# Patient Record
Sex: MALE | Race: WHITE | NOT HISPANIC OR LATINO | Employment: FULL TIME | URBAN - METROPOLITAN AREA
[De-identification: names, ages, dates, MRNs, and addresses within clinical notes are randomized per-mention and may not be internally consistent; named-entity substitution may affect disease eponyms.]

---

## 2020-12-05 ENCOUNTER — OFFICE VISIT (OUTPATIENT)
Dept: URGENT CARE | Facility: CLINIC | Age: 35
End: 2020-12-05
Payer: COMMERCIAL

## 2020-12-05 VITALS
HEIGHT: 68 IN | RESPIRATION RATE: 16 BRPM | HEART RATE: 77 BPM | OXYGEN SATURATION: 99 % | BODY MASS INDEX: 30.31 KG/M2 | DIASTOLIC BLOOD PRESSURE: 90 MMHG | TEMPERATURE: 99 F | WEIGHT: 200 LBS | SYSTOLIC BLOOD PRESSURE: 152 MMHG

## 2020-12-05 DIAGNOSIS — J00 ACUTE RHINITIS: Primary | ICD-10-CM

## 2020-12-05 LAB
CTP QC/QA: YES
SARS-COV-2 RDRP RESP QL NAA+PROBE: NEGATIVE

## 2020-12-05 PROCEDURE — 99203 OFFICE O/P NEW LOW 30 MIN: CPT | Mod: S$GLB,,, | Performed by: NURSE PRACTITIONER

## 2020-12-05 PROCEDURE — U0002: ICD-10-PCS | Mod: QW,S$GLB,, | Performed by: NURSE PRACTITIONER

## 2020-12-05 PROCEDURE — 99203 PR OFFICE/OUTPT VISIT, NEW, LEVL III, 30-44 MIN: ICD-10-PCS | Mod: S$GLB,,, | Performed by: NURSE PRACTITIONER

## 2020-12-05 PROCEDURE — U0002 COVID-19 LAB TEST NON-CDC: HCPCS | Mod: QW,S$GLB,, | Performed by: NURSE PRACTITIONER

## 2020-12-05 RX ORDER — FLUTICASONE PROPIONATE 50 MCG
1 SPRAY, SUSPENSION (ML) NASAL DAILY
Qty: 9.9 ML | Refills: 0 | Status: SHIPPED | OUTPATIENT
Start: 2020-12-05

## 2020-12-05 RX ORDER — CETIRIZINE HYDROCHLORIDE 10 MG/1
10 TABLET ORAL DAILY
Qty: 10 TABLET | Refills: 0 | Status: SHIPPED | OUTPATIENT
Start: 2020-12-05 | End: 2020-12-15

## 2020-12-05 NOTE — PATIENT INSTRUCTIONS
1.  Take all medications as directed. If you have been prescribed antibiotics, make sure to complete them.   2.  Rest and keep yourself/patient well hydrated. For adults, it is recommended to drink at least 8-10 glasses of water daily.   3.  For patients above 6 months of age who are not allergic to and are not on anticoagulants, you can alternate Tylenol and Motrin every 4-6 hours for fever above 100.4F and/or pain.  For patients less than 6 months of age, allergic to or intolerant to NSAIDS, have gastritis, gastric ulcers, or history of GI bleeds, are pregnant, or are on anticoagulant therapy, you can take Tylenol every 4 hours as needed for fever above 100.4F and/or pain.   4. You should schedule a follow-up appointment with your Primary Care Provider/Pediatrician for recheck in 2-3 days or as directed at this visit.   5.  If your condition fails to improve in a timely manner, you should receive another evaluation by your Primary Care Provider/Pediatrician to discuss your concerns or return to urgent care for a recheck.  If your condition worsens at any time, you should report immediately to your nearest Emergency Department for further evaluation. **You must understand that you have received Urgent Care treatment only and that you may be released before all of your medical problems are known or treated. You, the patient, are responsible to arrange for follow-up care as instructed.         Allergic Rhinitis  Allergic rhinitis is an allergic reaction that affects the nose, and often the eyes. Its often known as nasal allergies. Nasal allergies are often due to things in the environment that are breathed in. Depending what you are sensitive to, nasal allergies may occur only during certain seasons. Or they may occur year round. Common indoor allergens include house dust mites, mold, cockroaches, and pet dander. Outdoor allergens include pollen from trees, grasses, and weeds.   Symptoms include a drippy, stuffy,  and itchy nose. They also include sneezing and red and itchy eyes. You may feel tired more often. Severe allergies may also affect your breathing and trigger a condition called asthma.   Tests can be done to see what allergens are affecting you. You may be referred to an allergy specialist for testing and further evaluation.  Home care  Your healthcare provider may prescribe medicines to help relieve allergy symptoms. These may include oral medicines, nasal sprays, or eye drops.  Ask your provider for advice on how to avoid substances that you are allergic to. Below are a few tips for each type of allergen.  Pet dander:  · Do not have pets with fur and feathers.  · If you can't avoid having a pet, keep it out of your bedroom and off upholstered furniture.  Pollen:  · When pollen counts are high, keep windows of your car and home closed. If possible, use an air conditioner instead.  · Wear a filter mask when mowing or doing yard work.  House dust mites:  · Wash bedding every week in warm water and detergent and dry on a hot setting.  · Cover the mattress, box spring, and pillows with allergy covers.   · If possible, sleep in a room with no carpet, curtains, or upholstered furniture.  Cockroaches:  · Store food in sealed containers.  · Remove garbage from the home promptly.  · Fix water leaks  Mold:  · Keep humidity low by using a dehumidifier or air conditioner. Keep the dehumidifier and air conditioner clean and free of mold.  · Clean moldy areas with bleach and water.  In general:  · Vacuum once or twice a week. If possible, use a vacuum with a high-efficiency particulate air (HEPA) filter.  · Do not smoke. Avoid cigarette smoke. Cigarette smoke is an irritant that can make symptoms worse.  Follow-up care  Follow up as advised by the healthcare provider or our staff. If you were referred to an allergy specialist, make this appointment promptly.  When to seek medical advice  Call your healthcare provider right away  if the following occur:  · Coughing or wheezing  · Fever greater than 100.4°F (38°C)  · Hives (raised red bumps)  · Continuing symptoms, new symptoms, or worsening symptoms  Call 911 right away if you have:  · Trouble breathing  · Severe swelling of the face or severe itching of the eyes or mouth  Date Last Reviewed: 3/1/2017  © 7690-5389 [employer]. [address] [place] [address], [place] [address]. All rights reserved. This information is not intended as a substitute for professional medical care. Always follow your healthcare professional's instructions.

## 2020-12-05 NOTE — PROGRESS NOTES
"Subjective:       Patient ID: Clive Person is a 35 y.o. male.    Vitals:  height is 5' 8" (1.727 m) and weight is 90.7 kg (200 lb). His temperature is 98.5 °F (36.9 °C). His blood pressure is 152/90 (abnormal) and his pulse is 77. His respiration is 16 and oxygen saturation is 99%.     Chief Complaint: Nasal Congestion    Sinus Problem  This is a new problem. The current episode started in the past 7 days (x3days). The problem has been gradually worsening since onset. There has been no fever. Associated symptoms include congestion and sinus pressure. Pertinent negatives include no chills, coughing, diaphoresis, ear pain, headaches, shortness of breath, sneezing or sore throat. Past treatments include oral decongestants. The treatment provided mild relief.       Constitution: Negative for chills, sweating, fatigue and fever.   HENT: Positive for congestion and sinus pressure. Negative for ear pain, sinus pain, sore throat and voice change.    Neck: Negative for painful lymph nodes.   Eyes: Negative for eye redness.   Respiratory: Negative for chest tightness, cough, sputum production, bloody sputum, COPD, shortness of breath, stridor, wheezing and asthma.    Gastrointestinal: Negative for nausea and vomiting.   Musculoskeletal: Negative for muscle ache.   Skin: Negative for rash.   Allergic/Immunologic: Negative for seasonal allergies, asthma and sneezing.   Neurological: Negative for headaches.   Hematologic/Lymphatic: Negative for swollen lymph nodes.       Objective:      Physical Exam   Constitutional: He is oriented to person, place, and time. He appears well-developed. He is cooperative.  Non-toxic appearance. He does not appear ill. No distress.   HENT:   Head: Normocephalic and atraumatic.   Ears:   Right Ear: Hearing, tympanic membrane, external ear and ear canal normal.   Left Ear: Hearing, tympanic membrane, external ear and ear canal normal.   Nose: Congestion present. No mucosal edema, rhinorrhea or " nasal deformity. No epistaxis. Right sinus exhibits no maxillary sinus tenderness and no frontal sinus tenderness. Left sinus exhibits no maxillary sinus tenderness and no frontal sinus tenderness.   Mouth/Throat: Uvula is midline, oropharynx is clear and moist and mucous membranes are normal. No trismus in the jaw. Normal dentition. No uvula swelling. No oropharyngeal exudate, posterior oropharyngeal edema or posterior oropharyngeal erythema.   Eyes: Conjunctivae and lids are normal. No scleral icterus.   Neck: Trachea normal, full passive range of motion without pain and phonation normal. Neck supple. No neck rigidity. No edema and no erythema present.   Cardiovascular: Normal rate, regular rhythm, normal heart sounds and normal pulses.   No murmur heard.  Pulmonary/Chest: Effort normal and breath sounds normal. No stridor. No respiratory distress. He has no decreased breath sounds. He has no wheezes. He has no rhonchi. He has no rales. He exhibits no tenderness.   Abdominal: Normal appearance.   Musculoskeletal: Normal range of motion.         General: No deformity.   Lymphadenopathy:     He has no cervical adenopathy.   Neurological: He is alert and oriented to person, place, and time. He exhibits normal muscle tone. Coordination normal.   Skin: Skin is warm, dry, intact, not diaphoretic and not pale. Psychiatric: His speech is normal and behavior is normal. Judgment and thought content normal.   Nursing note and vitals reviewed.        Assessment:       1. Acute rhinitis        Plan:         Acute rhinitis  -     POCT COVID-19 Rapid Screening  -     cetirizine (ZYRTEC) 10 MG tablet; Take 1 tablet (10 mg total) by mouth once daily. for 10 days  Dispense: 10 tablet; Refill: 0  -     fluticasone propionate (FLONASE) 50 mcg/actuation nasal spray; 1 spray (50 mcg total) by Each Nostril route once daily.  Dispense: 9.9 mL; Refill: 0      Results for orders placed or performed in visit on 12/05/20   POCT COVID-19  Rapid Screening   Result Value Ref Range    POC Rapid COVID Negative Negative     Acceptable Yes      Patient Instructions   1.  Take all medications as directed. If you have been prescribed antibiotics, make sure to complete them.   2.  Rest and keep yourself/patient well hydrated. For adults, it is recommended to drink at least 8-10 glasses of water daily.   3.  For patients above 6 months of age who are not allergic to and are not on anticoagulants, you can alternate Tylenol and Motrin every 4-6 hours for fever above 100.4F and/or pain.  For patients less than 6 months of age, allergic to or intolerant to NSAIDS, have gastritis, gastric ulcers, or history of GI bleeds, are pregnant, or are on anticoagulant therapy, you can take Tylenol every 4 hours as needed for fever above 100.4F and/or pain.   4. You should schedule a follow-up appointment with your Primary Care Provider/Pediatrician for recheck in 2-3 days or as directed at this visit.   5.  If your condition fails to improve in a timely manner, you should receive another evaluation by your Primary Care Provider/Pediatrician to discuss your concerns or return to urgent care for a recheck.  If your condition worsens at any time, you should report immediately to your nearest Emergency Department for further evaluation. **You must understand that you have received Urgent Care treatment only and that you may be released before all of your medical problems are known or treated. You, the patient, are responsible to arrange for follow-up care as instructed.         Allergic Rhinitis  Allergic rhinitis is an allergic reaction that affects the nose, and often the eyes. Its often known as nasal allergies. Nasal allergies are often due to things in the environment that are breathed in. Depending what you are sensitive to, nasal allergies may occur only during certain seasons. Or they may occur year round. Common indoor allergens include house dust mites,  mold, cockroaches, and pet dander. Outdoor allergens include pollen from trees, grasses, and weeds.   Symptoms include a drippy, stuffy, and itchy nose. They also include sneezing and red and itchy eyes. You may feel tired more often. Severe allergies may also affect your breathing and trigger a condition called asthma.   Tests can be done to see what allergens are affecting you. You may be referred to an allergy specialist for testing and further evaluation.  Home care  Your healthcare provider may prescribe medicines to help relieve allergy symptoms. These may include oral medicines, nasal sprays, or eye drops.  Ask your provider for advice on how to avoid substances that you are allergic to. Below are a few tips for each type of allergen.  Pet dander:  · Do not have pets with fur and feathers.  · If you can't avoid having a pet, keep it out of your bedroom and off upholstered furniture.  Pollen:  · When pollen counts are high, keep windows of your car and home closed. If possible, use an air conditioner instead.  · Wear a filter mask when mowing or doing yard work.  House dust mites:  · Wash bedding every week in warm water and detergent and dry on a hot setting.  · Cover the mattress, box spring, and pillows with allergy covers.   · If possible, sleep in a room with no carpet, curtains, or upholstered furniture.  Cockroaches:  · Store food in sealed containers.  · Remove garbage from the home promptly.  · Fix water leaks  Mold:  · Keep humidity low by using a dehumidifier or air conditioner. Keep the dehumidifier and air conditioner clean and free of mold.  · Clean moldy areas with bleach and water.  In general:  · Vacuum once or twice a week. If possible, use a vacuum with a high-efficiency particulate air (HEPA) filter.  · Do not smoke. Avoid cigarette smoke. Cigarette smoke is an irritant that can make symptoms worse.  Follow-up care  Follow up as advised by the healthcare provider or our staff. If you were  referred to an allergy specialist, make this appointment promptly.  When to seek medical advice  Call your healthcare provider right away if the following occur:  · Coughing or wheezing  · Fever greater than 100.4°F (38°C)  · Hives (raised red bumps)  · Continuing symptoms, new symptoms, or worsening symptoms  Call 911 right away if you have:  · Trouble breathing  · Severe swelling of the face or severe itching of the eyes or mouth  Date Last Reviewed: 3/1/2017  © 8628-9574 [employer]. [address] [place] [address], [place] [address]. All rights reserved. This information is not intended as a substitute for professional medical care. Always follow your healthcare professional's instructions.

## 2020-12-05 NOTE — LETTER
[address] [facility], [facility] ? [place], [address] ? Phone [phone] ? Fax [phone]           Return to Work/School    Patient: Clive Person  YOB: 1985   Date: 12/05/2020      To Whom It May Concern:     Clive Person was in contact with/seen in my office on 12/05/2020. COVID-19 is present in our communities across the [facility]. Not all patients are eligible or appropriate to be tested. In this situation, your employee meets the following criteria:     Clive Person has met the criteria for COVID-19 testing and has a NEGATIVE result. The employee can return to work once they are asymptomatic for 24 hours without the use of fever reducing medications (Tylenol, Motrin, etc).     If you have any questions or concerns, or if I can be of further assistance, please do not hesitate to contact me.     Sincerely,      Venita Ramos NP

## 2025-03-25 ENCOUNTER — OFFICE VISIT (OUTPATIENT)
Dept: URGENT CARE | Facility: CLINIC | Age: 40
End: 2025-03-25
Payer: COMMERCIAL

## 2025-03-25 VITALS
BODY MASS INDEX: 30.31 KG/M2 | HEART RATE: 129 BPM | WEIGHT: 200 LBS | DIASTOLIC BLOOD PRESSURE: 94 MMHG | OXYGEN SATURATION: 97 % | HEIGHT: 68 IN | TEMPERATURE: 102 F | SYSTOLIC BLOOD PRESSURE: 156 MMHG | RESPIRATION RATE: 18 BRPM

## 2025-03-25 DIAGNOSIS — R19.7 DIARRHEA, UNSPECIFIED TYPE: ICD-10-CM

## 2025-03-25 DIAGNOSIS — R50.9 FEVER, UNSPECIFIED FEVER CAUSE: Primary | ICD-10-CM

## 2025-03-25 DIAGNOSIS — R11.0 NAUSEA: ICD-10-CM

## 2025-03-25 LAB
CTP QC/QA: YES
POC MOLECULAR INFLUENZA A AGN: NEGATIVE
POC MOLECULAR INFLUENZA B AGN: NEGATIVE

## 2025-03-25 PROCEDURE — 87502 INFLUENZA DNA AMP PROBE: CPT | Mod: QW,S$GLB,, | Performed by: PHYSICIAN ASSISTANT

## 2025-03-25 PROCEDURE — 99204 OFFICE O/P NEW MOD 45 MIN: CPT | Mod: S$GLB,,, | Performed by: PHYSICIAN ASSISTANT

## 2025-03-25 RX ORDER — ONDANSETRON 4 MG/1
4 TABLET, ORALLY DISINTEGRATING ORAL EVERY 6 HOURS PRN
Qty: 20 TABLET | Refills: 0 | Status: SHIPPED | OUTPATIENT
Start: 2025-03-25

## 2025-03-25 RX ORDER — DIPHENOXYLATE HYDROCHLORIDE AND ATROPINE SULFATE 2.5; .025 MG/1; MG/1
1 TABLET ORAL 4 TIMES DAILY PRN
Qty: 12 TABLET | Refills: 0 | Status: SHIPPED | OUTPATIENT
Start: 2025-03-25 | End: 2025-04-04

## 2025-03-25 NOTE — PROGRESS NOTES
"Subjective:      Patient ID: Clive Person is a 40 y.o. male.    Vitals:  height is 5' 8" (1.727 m) and weight is 90.7 kg (200 lb). His tympanic temperature is 101.7 °F (38.7 °C) (abnormal). His blood pressure is 156/94 (abnormal) and his pulse is 129 (abnormal). His respiration is 18 and oxygen saturation is 97%.     Chief Complaint: Diarrhea    Diarrhea   This is a new problem. Episode onset: 2 days ago. The problem occurs 2 to 4 times per day. The problem has been gradually worsening. The stool consistency is described as Watery. The patient states that diarrhea awakens him from sleep. Associated symptoms include chills, a fever (99.8) and headaches. Pertinent negatives include no abdominal pain, coughing, sweats or vomiting. Associated symptoms comments: Night sweats, nausea. He has tried nothing for the symptoms. There is no history of a recent abdominal surgery.       Constitution: Positive for chills and fever (99.8).   Respiratory:  Negative for cough.    Gastrointestinal:  Positive for diarrhea. Negative for abdominal pain and vomiting.   Neurological:  Positive for headaches.      Objective:     Physical Exam   Constitutional: He is oriented to person, place, and time. He appears well-developed. He is cooperative.  Non-toxic appearance. He does not appear ill. No distress.   HENT:   Head: Normocephalic and atraumatic.   Ears:   Right Ear: Hearing, tympanic membrane, external ear and ear canal normal.   Left Ear: Hearing, tympanic membrane, external ear and ear canal normal.   Nose: Nose normal. No rhinorrhea or congestion.   Mouth/Throat: Mucous membranes are moist. No oropharyngeal exudate or posterior oropharyngeal erythema. Oropharynx is clear.   Eyes: Conjunctivae and lids are normal. No scleral icterus.   Neck: Trachea normal and phonation normal. Neck supple. No edema present. No erythema present. No neck rigidity present.   Cardiovascular: Regular rhythm, normal heart sounds and normal pulses. " Tachycardia present.   No murmur heard.Exam reveals no gallop and no friction rub.   Pulmonary/Chest: Effort normal and breath sounds normal. No stridor. No respiratory distress. He has no wheezes. He has no rhonchi. He has no rales.   Abdominal: Normal appearance. He exhibits no distension and no mass. Soft. Bowel sounds are increased. There is no abdominal tenderness. There is no rebound, no guarding, no left CVA tenderness and no right CVA tenderness. No hernia.   Neurological: He is alert and oriented to person, place, and time. Coordination normal.   Skin: Skin is dry, intact, not diaphoretic and not pale.   Psychiatric: His speech is normal and behavior is normal. Judgment and thought content normal.   Nursing note and vitals reviewed.      Assessment:     1. Fever, unspecified fever cause    2. Diarrhea, unspecified type    3. Nausea        Plan:       Fever, unspecified fever cause  -     POCT Influenza A/B MOLECULAR    Diarrhea, unspecified type  -     diphenoxylate-atropine 2.5-0.025 mg (LOMOTIL) 2.5-0.025 mg per tablet; Take 1 tablet by mouth 4 (four) times daily as needed for Diarrhea.  Dispense: 12 tablet; Refill: 0    Nausea  -     ondansetron (ZOFRAN-ODT) 4 MG TbDL; Take 1 tablet (4 mg total) by mouth every 6 (six) hours as needed (nausea/vomiting/diarrhea).  Dispense: 20 tablet; Refill: 0      Results for orders placed or performed in visit on 03/25/25   POCT Influenza A/B MOLECULAR    Collection Time: 03/25/25 10:49 AM   Result Value Ref Range    POC Molecular Influenza A Ag Negative Negative    POC Molecular Influenza B Ag Negative Negative     Acceptable Yes      Alternate ibuprofen and tylenol as needed for fever/pain/body aches every 4-6 hours. Rest, increase PO fluids.   Discussed with patient the importance of f/u with their primary care provider. Urged to go to the ER for any worsening signs or symptoms.